# Patient Record
(demographics unavailable — no encounter records)

---

## 2025-01-12 NOTE — HISTORY OF PRESENT ILLNESS
[FreeTextEntry1] : Bhargav is an 80-year-old with history of abnormal MRI of prostate and elevated PSA. He has refused prostate biopsy on multiple occasions.  He reports urinating well.  Denies dysuria and gross hematuria.  Most recent PSA October 2024 was 15.10 ng/mL. PSA November 2023 was 19.6 ng/mL.  Prior imaging: US renal and bladder 1/2024 no significant renal findings renal cysts - simple prostate hypertrophy prostate volume 36 cc PVR 11  MRI prostate 10/2020 IMPRESSION:  PI-RADSv2 Category 5 - Very high (clinically significant cancer is highly likely to be present). 1.6 cm lesion in the right peripheral zone from the base to the mid gland highly suspicious for malignancy. There is bulging and broad-based contact with the prostatic capsule but no definite extraprostatic extension.  PSA 13.18 4K score 91% 8/2020 Patient reports he had an elevated PSA and refused biopsy previously although today he states he did have one negative prior prostate biopsy. Old records reviewed Dr Mueller and June 2019 PSA was 12.58. At the time sonogram showed a prostate volume 38 g postvoid residual 42 mL.  Refused biopsy in past. No showed to follow up visits - last seen 2/2024

## 2025-01-12 NOTE — ASSESSMENT
[FreeTextEntry1] : Bhargav is an 80-year-old with history of abnormal MRI of prostate and elevated PSA. He has refused prostate biopsy on multiple occasions.  He reports urinating well.  Denies dysuria and gross hematuria.  Most recent PSA October 2024 was 15.10 ng/mL. PSA November 2023 was 19.6 ng/mL.  Prior imaging: US renal and bladder 1/2024 no significant renal findings renal cysts - simple prostate hypertrophy prostate volume 36 cc PVR 11  MRI prostate 10/2020 IMPRESSION:  PI-RADSv2 Category 5 - Very high (clinically significant cancer is highly likely to be present). 1.6 cm lesion in the right peripheral zone from the base to the mid gland highly suspicious for malignancy. There is bulging and broad-based contact with the prostatic capsule but no definite extraprostatic extension.  PSA 13.18 4K score 91% 8/2020 Patient reports he had an elevated PSA and refused biopsy previously although today he states he did have one negative prior prostate biopsy. Old records reviewed Dr Mueller and June 2019 PSA was 12.58. At the time sonogram showed a prostate volume 38 g postvoid residual 42 mL.  Refused biopsy in past. No showed to follow up visits - last seen 2/2024  Plan  80-year-old with elevated PSA and abnormal MRI in 2020. History of refusing biopsies that are recommended and no showing to follow-up appointments.  We again reviewed his PSA history and findings on MRI. We again clearly outlined concern for prostate cancer. Again prostate biopsy is urged. Indication for prostate biopsies to rule out aggressive prostate cancer.  Patient verbalized understanding and after discussion today is agreeable to schedule transperineal prostate biopsy. Procedure was again reviewed with patient. Risks of bleeding, infection, and risks of urinary retention outlined.  OR order placed.  Follow-up after biopsy to review pathology.

## 2025-01-12 NOTE — ADDENDUM
[FreeTextEntry1] : Documented by DARNELL Alfred acting as a scribe for Dr. Venkat Cornell   All medical record entries made by the Scribe were at my, Dr. Cornell direction and personally dictated by me.  I have reviewed the chart and agree that the record accurately reflects my personal performance of the history, physical exam, procedure and imaging.

## 2025-01-12 NOTE — LETTER BODY
[Dear  ___] : Dear  [unfilled], [Consult Letter:] : I had the pleasure of evaluating your patient, [unfilled]. [Please see my note below.] : Please see my note below. [Sincerely,] : Sincerely, [FreeTextEntry3] : Venkat Cornell MD, FACS